# Patient Record
(demographics unavailable — no encounter records)

---

## 2024-10-15 NOTE — COUNSELING
[Fall prevention counseling provided] : Fall prevention counseling provided [Adequate lighting] : Adequate lighting [No throw rugs] : No throw rugs [Use proper foot wear] : Use proper foot wear [Behavioral health counseling provided] : Behavioral health counseling provided [Sleep ___ hours/day] : Sleep [unfilled] hours/day [Engage in a relaxing activity] : Engage in a relaxing activity [Yes] : Risk of tobacco use and health benefits of smoking cessation discussed: Yes [Cessation strategies including cessation program discussed] : Cessation strategies including cessation program discussed [AUDIT-C Screening administered and reviewed] : AUDIT-C Screening administered and reviewed [Hazards of at-risk alcohol use discussed] : Hazards of at-risk alcohol use discussed [Potential consequences of obesity discussed] : Potential consequences of obesity discussed [Benefits of weight loss discussed] : Benefits of weight loss discussed [Encouraged to increase physical activity] : Encouraged to increase physical activity [Weigh Self Weekly] : weigh self weekly [None] : None [Good understanding] : Patient has a good understanding of lifestyle changes and steps needed to achieve self management goal

## 2024-10-17 NOTE — ASSESSMENT
[FreeTextEntry1] : Overweight/Fatigue - labs for fatigue and dysmetabolism  Low Vit. D - Vit D Level  Immunity Status Testing - Titer Panel. HLD - Lipid Panel  OB/GYN - last seen 16 years ago. Referred today.  Pilonidal Cyst - Managed by Colon/Rectal (Dr. Rodriguez)  Overweight - pending labs will consider Ozempic at next appt.   F/U in 1-2 weeks to review results.

## 2024-10-17 NOTE — HEALTH RISK ASSESSMENT
[Good] : ~his/her~  mood as  good [1 or 2 (0 pts)] : 1 or 2 (0 points) [No falls in past year] : Patient reported no falls in the past year [0] : 2) Feeling down, depressed, or hopeless: Not at all (0) [PHQ-2 Negative - No further assessment needed] : PHQ-2 Negative - No further assessment needed [NO] : No [Patient declined Low Dose CT Scan] : Patient declined Low Dose CT Scan [No Retinopathy] : No retinopathy [No] : No [Never (0 pts)] : Never (0 points) [Current] : Current [0-4] : 0-4 [Patient declined mammogram] : Patient declined mammogram [Patient declined bone density test] : Patient declined bone density test [Patient declined colonoscopy] : Patient declined colonoscopy [HIV test declined] : HIV test declined [Hepatitis C test declined] : Hepatitis C test declined [None] : None [With Family] : lives with family [# of Members in Household ___] :  household currently consist of [unfilled] member(s) [Employed] : employed [College] : College [Single] : single [# Of Children ___] : has [unfilled] children [Sexually Active] : sexually active [Feels Safe at Home] : Feels safe at home [Fully functional (bathing, dressing, toileting, transferring, walking, feeding)] : Fully functional (bathing, dressing, toileting, transferring, walking, feeding) [Fully functional (using the telephone, shopping, preparing meals, housekeeping, doing laundry, using] : Fully functional and needs no help or supervision to perform IADLs (using the telephone, shopping, preparing meals, housekeeping, doing laundry, using transportation, managing medications and managing finances) [Reports normal functional visual acuity (ie: able to read med bottle)] : Reports normal functional visual acuity [Smoke Detector] : smoke detector [Carbon Monoxide Detector] : carbon monoxide detector [Safety elements used in home] : safety elements used in home [Seat Belt] :  uses seat belt [Sunscreen] : uses sunscreen [Patient/Caregiver not ready to engage] : , patient/caregiver not ready to engage [I will adhere to the patient's wishes.] : I will adhere to the patient's wishes. [Time Spent: ___ minutes] : Time Spent: [unfilled] minutes [Audit-CScore] : 0 [de-identified] : Average [de-identified] : Average [[facility]] : 6 [[id number]] : 0 [LowDoseCTScan] : 10/24 [EyeExamDate] : 10/24 [Change in mental status noted] : No change in mental status noted [Language] : denies difficulty with language [Behavior] : denies difficulty with behavior [Learning/Retaining New Information] : denies difficulty learning/retaining new information [Handling Complex Tasks] : denies difficulty handling complex tasks [Reasoning] : denies difficulty with reasoning [Spatial Ability and Orientation] : denies difficulty with spatial ability and orientation [High Risk Behavior] : no high risk behavior [Reports changes in hearing] : Reports no changes in hearing [Reports changes in vision] : Reports no changes in vision [Reports changes in dental health] : Reports no changes in dental health [Travel to Developing Areas] : does not  travel to developing areas [TB Exposure] : is not being exposed to tuberculosis [Caregiver Concerns] : does not have caregiver concerns [MammogramDate] : 10/24 [PapSmearDate] : 10/24 [PapSmearComments] : referred today  [BoneDensityDate] : 10/24 [ColonoscopyDate] : 10/24 [HIVDate] : 10/24 [HepatitisCDate] : 10/24 [FreeTextEntry2] : No-Fault   [AdvancecareDate] : 10/24

## 2024-10-17 NOTE — REVIEW OF SYSTEMS
[Fatigue] : fatigue [Hair Changes] : hair changes [Negative] : Genitourinary [Fever] : no fever [Chills] : no chills [Hot Flashes] : no hot flashes [Night Sweats] : no night sweats [Recent Change In Weight] : ~T no recent weight change [Itching] : no itching [Mole Changes] : no mole changes [Nail Changes] : no nail changes [Skin Rash] : no skin rash [FreeTextEntry2] : overweight [FreeTextEntry7] :   Pilonidal Cyst, Cholecystectomy [de-identified] : Acne Vulgaris, hirsutism  [FreeTextEntry1] : Vit D Deficient

## 2024-10-17 NOTE — HEALTH RISK ASSESSMENT
[Good] : ~his/her~  mood as  good [1 or 2 (0 pts)] : 1 or 2 (0 points) [No falls in past year] : Patient reported no falls in the past year [0] : 2) Feeling down, depressed, or hopeless: Not at all (0) [PHQ-2 Negative - No further assessment needed] : PHQ-2 Negative - No further assessment needed [NO] : No [Patient declined Low Dose CT Scan] : Patient declined Low Dose CT Scan [No Retinopathy] : No retinopathy [No] : No [Never (0 pts)] : Never (0 points) [Current] : Current [0-4] : 0-4 [Patient declined mammogram] : Patient declined mammogram [Patient declined bone density test] : Patient declined bone density test [Patient declined colonoscopy] : Patient declined colonoscopy [HIV test declined] : HIV test declined [Hepatitis C test declined] : Hepatitis C test declined [None] : None [With Family] : lives with family [# of Members in Household ___] :  household currently consist of [unfilled] member(s) [Employed] : employed [College] : College [Single] : single [# Of Children ___] : has [unfilled] children [Sexually Active] : sexually active [Feels Safe at Home] : Feels safe at home [Fully functional (bathing, dressing, toileting, transferring, walking, feeding)] : Fully functional (bathing, dressing, toileting, transferring, walking, feeding) [Fully functional (using the telephone, shopping, preparing meals, housekeeping, doing laundry, using] : Fully functional and needs no help or supervision to perform IADLs (using the telephone, shopping, preparing meals, housekeeping, doing laundry, using transportation, managing medications and managing finances) [Reports normal functional visual acuity (ie: able to read med bottle)] : Reports normal functional visual acuity [Smoke Detector] : smoke detector [Carbon Monoxide Detector] : carbon monoxide detector [Safety elements used in home] : safety elements used in home [Seat Belt] :  uses seat belt [Sunscreen] : uses sunscreen [Patient/Caregiver not ready to engage] : , patient/caregiver not ready to engage [I will adhere to the patient's wishes.] : I will adhere to the patient's wishes. [Time Spent: ___ minutes] : Time Spent: [unfilled] minutes [Audit-CScore] : 0 [de-identified] : Average [de-identified] : Average [facility]] : [facility] [[id number]] : 0 [LowDoseCTScan] : 10/24 [EyeExamDate] : 10/24 [Change in mental status noted] : No change in mental status noted [Language] : denies difficulty with language [Behavior] : denies difficulty with behavior [Learning/Retaining New Information] : denies difficulty learning/retaining new information [Handling Complex Tasks] : denies difficulty handling complex tasks [Reasoning] : denies difficulty with reasoning [Spatial Ability and Orientation] : denies difficulty with spatial ability and orientation [High Risk Behavior] : no high risk behavior [Reports changes in hearing] : Reports no changes in hearing [Reports changes in vision] : Reports no changes in vision [Reports changes in dental health] : Reports no changes in dental health [Travel to Developing Areas] : does not  travel to developing areas [TB Exposure] : is not being exposed to tuberculosis [Caregiver Concerns] : does not have caregiver concerns [MammogramDate] : 10/24 [PapSmearDate] : 10/24 [PapSmearComments] : referred today  [BoneDensityDate] : 10/24 [ColonoscopyDate] : 10/24 [HIVDate] : 10/24 [HepatitisCDate] : 10/24 [FreeTextEntry2] : No-Fault   [AdvancecareDate] : 10/24

## 2024-10-17 NOTE — HEALTH RISK ASSESSMENT
[Good] : ~his/her~  mood as  good [1 or 2 (0 pts)] : 1 or 2 (0 points) [No falls in past year] : Patient reported no falls in the past year [0] : 2) Feeling down, depressed, or hopeless: Not at all (0) [PHQ-2 Negative - No further assessment needed] : PHQ-2 Negative - No further assessment needed [NO] : No [Patient declined Low Dose CT Scan] : Patient declined Low Dose CT Scan [No Retinopathy] : No retinopathy [No] : No [Never (0 pts)] : Never (0 points) [Current] : Current [0-4] : 0-4 [Patient declined mammogram] : Patient declined mammogram [Patient declined bone density test] : Patient declined bone density test [Patient declined colonoscopy] : Patient declined colonoscopy [HIV test declined] : HIV test declined [Hepatitis C test declined] : Hepatitis C test declined [None] : None [With Family] : lives with family [# of Members in Household ___] :  household currently consist of [unfilled] member(s) [Employed] : employed [College] : College [Single] : single [# Of Children ___] : has [unfilled] children [Sexually Active] : sexually active [Feels Safe at Home] : Feels safe at home [Fully functional (bathing, dressing, toileting, transferring, walking, feeding)] : Fully functional (bathing, dressing, toileting, transferring, walking, feeding) [Fully functional (using the telephone, shopping, preparing meals, housekeeping, doing laundry, using] : Fully functional and needs no help or supervision to perform IADLs (using the telephone, shopping, preparing meals, housekeeping, doing laundry, using transportation, managing medications and managing finances) [Reports normal functional visual acuity (ie: able to read med bottle)] : Reports normal functional visual acuity [Smoke Detector] : smoke detector [Carbon Monoxide Detector] : carbon monoxide detector [Safety elements used in home] : safety elements used in home [Seat Belt] :  uses seat belt [Sunscreen] : uses sunscreen [Patient/Caregiver not ready to engage] : , patient/caregiver not ready to engage [I will adhere to the patient's wishes.] : I will adhere to the patient's wishes. [Time Spent: ___ minutes] : Time Spent: [unfilled] minutes [Audit-CScore] : 0 [de-identified] : Average [de-identified] : Average [facility]] : 6 [[id number]] : 0 [LowDoseCTScan] : 10/24 [EyeExamDate] : 10/24 [Change in mental status noted] : No change in mental status noted [Language] : denies difficulty with language [Behavior] : denies difficulty with behavior [Learning/Retaining New Information] : denies difficulty learning/retaining new information [Handling Complex Tasks] : denies difficulty handling complex tasks [Reasoning] : denies difficulty with reasoning [Spatial Ability and Orientation] : denies difficulty with spatial ability and orientation [High Risk Behavior] : no high risk behavior [Reports changes in hearing] : Reports no changes in hearing [Reports changes in vision] : Reports no changes in vision [Reports changes in dental health] : Reports no changes in dental health [Travel to Developing Areas] : does not  travel to developing areas [TB Exposure] : is not being exposed to tuberculosis [Caregiver Concerns] : does not have caregiver concerns [MammogramDate] : 10/24 [PapSmearDate] : 10/24 [PapSmearComments] : referred today  [BoneDensityDate] : 10/24 [ColonoscopyDate] : 10/24 [HIVDate] : 10/24 [HepatitisCDate] : 10/24 [FreeTextEntry2] : No-Fault   [AdvancecareDate] : 10/24

## 2024-10-17 NOTE — REVIEW OF SYSTEMS
[Fatigue] : fatigue [Hair Changes] : hair changes [Negative] : Genitourinary [Fever] : no fever [Chills] : no chills [Hot Flashes] : no hot flashes [Night Sweats] : no night sweats [Recent Change In Weight] : ~T no recent weight change [Itching] : no itching [Mole Changes] : no mole changes [Nail Changes] : no nail changes [Skin Rash] : no skin rash [FreeTextEntry2] : overweight [FreeTextEntry7] :   Pilonidal Cyst, Cholecystectomy [de-identified] : Acne Vulgaris, hirsutism  [FreeTextEntry1] : Vit D Deficient

## 2024-10-17 NOTE — REVIEW OF SYSTEMS
[Fatigue] : fatigue [Hair Changes] : hair changes [Negative] : Genitourinary [Fever] : no fever [Chills] : no chills [Hot Flashes] : no hot flashes [Night Sweats] : no night sweats [Recent Change In Weight] : ~T no recent weight change [Itching] : no itching [Mole Changes] : no mole changes [Nail Changes] : no nail changes [Skin Rash] : no skin rash [FreeTextEntry2] : overweight [FreeTextEntry7] :   Pilonidal Cyst, Cholecystectomy [de-identified] : Acne Vulgaris, hirsutism  [FreeTextEntry1] : Vit D Deficient

## 2024-10-28 NOTE — HEALTH RISK ASSESSMENT
[No] : No [1 or 2 (0 pts)] : 1 or 2 (0 points) [Never (0 pts)] : Never (0 points) [No falls in past year] : Patient reported no falls in the past year [0] : 2) Feeling down, depressed, or hopeless: Not at all (0) [PHQ-2 Negative - No further assessment needed] : PHQ-2 Negative - No further assessment needed [Patient/Caregiver not ready to engage] : , patient/caregiver not ready to engage [I will adhere to the patient's wishes.] : I will adhere to the patient's wishes. [Time Spent: ___ minutes] : Time Spent: [unfilled] minutes [Current] : Current [0-4] : 0-4

## 2025-02-24 NOTE — REVIEW OF SYSTEMS
[Fatigue] : fatigue [Hair Changes] : hair changes [Negative] : Heme/Lymph [Fever] : no fever [Chills] : no chills [Hot Flashes] : no hot flashes [Night Sweats] : no night sweats [Recent Change In Weight] : ~T no recent weight change [Itching] : no itching [Mole Changes] : no mole changes [Nail Changes] : no nail changes [Skin Rash] : no skin rash [FreeTextEntry2] : overweight [FreeTextEntry7] :   Pilonidal Cyst, Cholecystectomy [de-identified] : Acne Vulgaris, hirsutism  [FreeTextEntry1] : Vit D Deficient

## 2025-02-24 NOTE — ASSESSMENT
[FreeTextEntry1] : Overweight/Fatigue - Labs for Fatigue and Dysmetabolism. Low Vit. D - Vit D Level. Immunity Status Testing - Pt. Requires 2 MMr and 2 Varicella Vaccines. HLD - Lipid Panel  OB/GYN - last seen 16 years ago. Referred pt making appt next week.  Pilonidal Cyst Flare - Bactrim + Duricef BID  Surgical Referral. Overweight - pending labs will consider Ozempic at next appt.   F/U in 3 wks to Review Test Results

## 2025-02-24 NOTE — HEALTH RISK ASSESSMENT
[No] : No [1 or 2 (0 pts)] : 1 or 2 (0 points) [Never (0 pts)] : Never (0 points) [No falls in past year] : Patient reported no falls in the past year [0] : 2) Feeling down, depressed, or hopeless: Not at all (0) [PHQ-2 Negative - No further assessment needed] : PHQ-2 Negative - No further assessment needed [Patient/Caregiver not ready to engage] : , patient/caregiver not ready to engage [I will adhere to the patient's wishes.] : I will adhere to the patient's wishes. [Time Spent: ___ minutes] : Time Spent: [unfilled] minutes [Current] : Current [0-4] : 0-4 [Audit-CScore] : 0 [de-identified] : Average [de-identified] : Average [[id number]] : 0 [[facility] : [facility] [AdvancecareDate] : 10/24

## 2025-02-24 NOTE — HISTORY OF PRESENT ILLNESS
[FreeTextEntry1] : F/U appt for lab testing review.  [de-identified] : F/U appt for lab testing review.

## 2025-02-24 NOTE — HEALTH RISK ASSESSMENT
[No] : No [1 or 2 (0 pts)] : 1 or 2 (0 points) [Never (0 pts)] : Never (0 points) [No falls in past year] : Patient reported no falls in the past year [0] : 2) Feeling down, depressed, or hopeless: Not at all (0) [PHQ-2 Negative - No further assessment needed] : PHQ-2 Negative - No further assessment needed [Patient/Caregiver not ready to engage] : , patient/caregiver not ready to engage [I will adhere to the patient's wishes.] : I will adhere to the patient's wishes. [Time Spent: ___ minutes] : Time Spent: [unfilled] minutes [Current] : Current [0-4] : 0-4 [Audit-CScore] : 0 [de-identified] : Average [de-identified] : Average [[id number]] : 0 [facility] : [facility] [AdvancecareDate] : 10/24

## 2025-02-24 NOTE — REVIEW OF SYSTEMS
[Fatigue] : fatigue [Hair Changes] : hair changes [Negative] : Heme/Lymph [Fever] : no fever [Chills] : no chills [Hot Flashes] : no hot flashes [Night Sweats] : no night sweats [Recent Change In Weight] : ~T no recent weight change [Itching] : no itching [Mole Changes] : no mole changes [Nail Changes] : no nail changes [Skin Rash] : no skin rash [FreeTextEntry2] : overweight [FreeTextEntry7] :   Pilonidal Cyst, Cholecystectomy [de-identified] : Acne Vulgaris, hirsutism  [FreeTextEntry1] : Vit D Deficient

## 2025-02-24 NOTE — HISTORY OF PRESENT ILLNESS
[FreeTextEntry1] : F/U appt for lab testing review.  [de-identified] : F/U appt for lab testing review.

## 2025-03-10 NOTE — HISTORY OF PRESENT ILLNESS
[FreeTextEntry1] : 3/10/2025: Patient presents with complaints of persistent pilonidal disease.  Not currently flaring but the area has drained frequently.  She was planned to have surgery for this last year but had issues with the flu and bronchitis that forced her to cancel.  She cannot recall the last time she was able to clip the hair to the area.  2/12/2024: Presents today with recurrence of pilonidal abscess over the last several days.  No fevers, no drainage.  Symptoms are similar to before. She would like to proceed with eventual surgery.  1/1/28/2023: Presents for follow-up of pilonidal cyst.  Her symptoms are very manageable.  Two weeks after her last visit the area flared up again but since then it has been quiescent.  She has kept up with hair removal by shaving the area regularly.  In the interim she has undergone cholecystectomy and is recovering well from that.  9/11/2023: 34yoF with no significant PMH who presents with pilonidal cyst.  She has undergone incision and drainage x2 for pilonidal abscess, first in 12/2022 and most recently at the end of August.  Prior to this she had never had any issues.  No fever currently but when she had infection she did have fevers.  Area is still draining slightly but no overt pain.  Her brother has a pilonidal cyst but this has never required treatment.  Reports family history of colon cancer in her paternal grandfather; parents are up to date on colonoscopy and no issues as far as she knows.

## 2025-03-10 NOTE — PHYSICAL EXAM
[JVD] : no jugular venous distention  [Respiratory Effort] : normal respiratory effort [Normal Rate and Rhythm] : normal rate and rhythm [No Edema] : No edema [Alert] : alert [Oriented to Person] : oriented to person [Oriented to Place] : oriented to place [Oriented to Time] : oriented to time [Calm] : calm [de-identified] : Soft, nondistended [de-identified] : No distress [de-identified] : Normocephalic [de-identified] : Moves all extremities [de-identified] : At the top of the gluteal cleft to the right there is a pilonidal cyst without erythema or fluctuance.

## 2025-03-10 NOTE — PLAN
[TextEntry] : -- Patient has persistent pilonidal disease and is eager to move forward with scheduling excision -- Plan for OR next week.  Reviewed recent labs and note from her PCP.  CBC with minimal elevation to WBC; A1C normal, CMP normal.  Cleared for procedure from my standpoint.  Would defer decision of IV sedation vs general endotracheal anesthesia to Anesthesia team pending evaluation in preop area. -- Discussed with patient risks of bleeding, infection, recurrence, prolonged wound healing, risks of anesthesia.  She understands. -- I will next see patient for her scheduled surgery.

## 2025-03-18 NOTE — HEALTH RISK ASSESSMENT
[No] : No [1 or 2 (0 pts)] : 1 or 2 (0 points) [Never (0 pts)] : Never (0 points) [No falls in past year] : Patient reported no falls in the past year [0] : 2) Feeling down, depressed, or hopeless: Not at all (0) [PHQ-2 Negative - No further assessment needed] : PHQ-2 Negative - No further assessment needed [Patient/Caregiver not ready to engage] : , patient/caregiver not ready to engage [I will adhere to the patient's wishes.] : I will adhere to the patient's wishes. [Time Spent: ___ minutes] : Time Spent: [unfilled] minutes [Current] : Current [0-4] : 0-4 [Audit-CScore] : 0 [de-identified] : Average [de-identified] : Average [facility] : [facility] [[id number]] : 0 [AdvancecareDate] : 10/24 negative details…

## 2025-03-18 NOTE — HISTORY OF PRESENT ILLNESS
[FreeTextEntry1] : F/U appt for lab testing review.  [de-identified] : F/U appt for lab testing review.

## 2025-03-18 NOTE — HISTORY OF PRESENT ILLNESS
[FreeTextEntry1] : F/U appt for lab testing review.  [de-identified] : F/U appt for lab testing review.

## 2025-03-18 NOTE — REVIEW OF SYSTEMS
[Fatigue] : fatigue [Hair Changes] : hair changes [Negative] : Heme/Lymph [Fever] : no fever [Chills] : no chills [Hot Flashes] : no hot flashes [Night Sweats] : no night sweats [Recent Change In Weight] : ~T no recent weight change [Itching] : no itching [Mole Changes] : no mole changes [Nail Changes] : no nail changes [Skin Rash] : no skin rash [FreeTextEntry2] : overweight [FreeTextEntry7] :   Pilonidal Cyst, Cholecystectomy [de-identified] : Acne Vulgaris, hirsutism  [FreeTextEntry1] : Vit D Deficient

## 2025-03-18 NOTE — HEALTH RISK ASSESSMENT
[No] : No [1 or 2 (0 pts)] : 1 or 2 (0 points) [Never (0 pts)] : Never (0 points) [No falls in past year] : Patient reported no falls in the past year [0] : 2) Feeling down, depressed, or hopeless: Not at all (0) [PHQ-2 Negative - No further assessment needed] : PHQ-2 Negative - No further assessment needed [Patient/Caregiver not ready to engage] : , patient/caregiver not ready to engage [I will adhere to the patient's wishes.] : I will adhere to the patient's wishes. [Time Spent: ___ minutes] : Time Spent: [unfilled] minutes [Current] : Current [0-4] : 0-4 [Audit-CScore] : 0 [de-identified] : Average [de-identified] : Average [[facility]] : 6 [[id number]] : 0 [AdvancecareDate] : 10/24

## 2025-03-18 NOTE — ASSESSMENT
[FreeTextEntry1] : Overweight/Fatigue/PVD/ - Diet and Exercise Consider Wegovy. Low Vit. D - Vit D Abnormal CBC/Low Iron - Hematology Referral. Immunity Status Testing - Pt. Requires 2 MMr and 2 Varicella Vaccines. HLD - Lipid Panel  OB/GYN - last seen 16 years ago. Referred pt making appt next week.  Pilonidal Cyst Flare - Bactrim + Duricef BID  Surgical Referral. Overweight - pending labs will consider Ozempic at next appt.   F/U in 3 wks to Review Test Results

## 2025-03-18 NOTE — REVIEW OF SYSTEMS
[Fatigue] : fatigue [Hair Changes] : hair changes [Negative] : Heme/Lymph [Fever] : no fever [Chills] : no chills [Hot Flashes] : no hot flashes [Night Sweats] : no night sweats [Recent Change In Weight] : ~T no recent weight change [Itching] : no itching [Mole Changes] : no mole changes [Nail Changes] : no nail changes [Skin Rash] : no skin rash [FreeTextEntry2] : overweight [FreeTextEntry7] :   Pilonidal Cyst, Cholecystectomy [de-identified] : Acne Vulgaris, hirsutism  [FreeTextEntry1] : Vit D Deficient

## 2025-04-09 NOTE — HISTORY OF PRESENT ILLNESS
[FreeTextEntry1] : 36 year old female with hx of pilonidal disease with recurrent abscess/cyst s/p excision of pilonidal cyst with Dr. Rodriugez on 3/20/25. Pt here for post-op wound check. Pt doing well. Has no complaints. States drainage stopped 1-2 days ago. Denied any pain.

## 2025-04-09 NOTE — ASSESSMENT
[FreeTextEntry1] : 36 year old female with hx of pilonidal disease with recurrent abscess/cyst s/p excision of pilonidal cyst with Dr. Rodriguez on 3/20/25. Patient doing well post-op. Advised pt to keep wound clean and dry. Follow-up with Dr. Rodriguez in 3-4 weeks. Advised pt to call us for any concerns prior to appointment.

## 2025-04-09 NOTE — PHYSICAL EXAM
[Normal Rate and Rhythm] : normal rate and rhythm [Calm] : calm [de-identified] : Incision healing with Vicryl sutures intact. Inferior portion with good granulation tissue. Non-erythematous, no induration, no drainage noted. [de-identified] : well appearing in NAD [de-identified] : normocephalic atraumatic [de-identified] : no JVD [de-identified] : normal respiratory effort [de-identified] : moving all extremities [de-identified] : warm moist [de-identified] : A&O x 3

## 2025-04-09 NOTE — DATA REVIEWED
[FreeTextEntry1] : [facility] Accession Number : [id number] Patient:     NIXON HOOPER   Accession:                             [id number]  Collected Date/Time:                   3/20/2025 13:00 EDT Received Date/Time:                    3/20/2025 14:01 EDT  Surgical Pathology Report - Auth (Verified)  Specimen(s) Submitted 1  Pilonidal cyst short stitch superior, long stitch patient right  Final Diagnosis  Skin and soft tissue , pilonidal cyst , excision - Hyperplastic skin with dermal scar and chronic inflammation - Squamous sinus tract with pseudocyst and abscess  - Negative for malignancy Verified by: Rhett Guardado M.D. (Electronic Signature) Reported on: 03/26/25 13:52 EDT, [facility], [address] [place] [address] _________________________________________________________________

## 2025-04-21 NOTE — HEALTH RISK ASSESSMENT
[No] : No [1 or 2 (0 pts)] : 1 or 2 (0 points) [Never (0 pts)] : Never (0 points) [No falls in past year] : Patient reported no falls in the past year [0] : 2) Feeling down, depressed, or hopeless: Not at all (0) [PHQ-2 Negative - No further assessment needed] : PHQ-2 Negative - No further assessment needed [Patient/Caregiver not ready to engage] : , patient/caregiver not ready to engage [I will adhere to the patient's wishes.] : I will adhere to the patient's wishes. [Time Spent: ___ minutes] : Time Spent: [unfilled] minutes [Current] : Current [0-4] : 0-4 [Audit-CScore] : 0 [de-identified] : Average [de-identified] : Average [[facility]] : 6 [[id number]] : 0 [AdvancecareDate] : 10/24

## 2025-04-21 NOTE — ASSESSMENT
[FreeTextEntry1] : Overweight/Fatigue/PVD/ - Diet and Exercise Consider Wegovy. Low Vit. D - Vit D Abnormal CBC/Low Iron - Hematology Referral. Immunity Status Testing - Pt. Requires 2 MMr and 2 Varicella Vaccines. HLD - Lipid Panel  OB/GYN - last seen 16 years ago. Referred pt making appt next week.  Pilonidal Cyst Flare - Bactrim + Duricef BID  Surgical Referral. Overweight - pending labs will consider Ozempic at next appt.  MMR Vaccine administered left arm NDC: [id number] [id number]  F/U in 4 weeks for varicella #1

## 2025-04-21 NOTE — REVIEW OF SYSTEMS
[Fatigue] : fatigue [Hair Changes] : hair changes [Negative] : Heme/Lymph [Fever] : no fever [Chills] : no chills [Hot Flashes] : no hot flashes [Night Sweats] : no night sweats [Recent Change In Weight] : ~T no recent weight change [Itching] : no itching [Mole Changes] : no mole changes [Nail Changes] : no nail changes [Skin Rash] : no skin rash [FreeTextEntry2] : overweight [FreeTextEntry7] :   Pilonidal Cyst, Cholecystectomy [de-identified] : Acne Vulgaris, hirsutism  [FreeTextEntry1] : Vit D Deficient

## 2025-04-21 NOTE — HEALTH RISK ASSESSMENT
[No] : No [1 or 2 (0 pts)] : 1 or 2 (0 points) [Never (0 pts)] : Never (0 points) [No falls in past year] : Patient reported no falls in the past year [0] : 2) Feeling down, depressed, or hopeless: Not at all (0) [PHQ-2 Negative - No further assessment needed] : PHQ-2 Negative - No further assessment needed [Patient/Caregiver not ready to engage] : , patient/caregiver not ready to engage [I will adhere to the patient's wishes.] : I will adhere to the patient's wishes. [Time Spent: ___ minutes] : Time Spent: [unfilled] minutes [Current] : Current [0-4] : 0-4 [Audit-CScore] : 0 [de-identified] : Average [de-identified] : Average [[facility]] : 6 [[id number]] : 0 [AdvancecareDate] : 10/24

## 2025-04-21 NOTE — REVIEW OF SYSTEMS
[Fatigue] : fatigue [Hair Changes] : hair changes [Negative] : Heme/Lymph [Fever] : no fever [Chills] : no chills [Hot Flashes] : no hot flashes [Night Sweats] : no night sweats [Recent Change In Weight] : ~T no recent weight change [Itching] : no itching [Mole Changes] : no mole changes [Nail Changes] : no nail changes [Skin Rash] : no skin rash [FreeTextEntry2] : overweight [FreeTextEntry7] :   Pilonidal Cyst, Cholecystectomy [de-identified] : Acne Vulgaris, hirsutism  [FreeTextEntry1] : Vit D Deficient

## 2025-04-21 NOTE — ASSESSMENT
[FreeTextEntry1] : Overweight/Fatigue/PVD/ - Diet and Exercise Consider Wegovy. Low Vit. D - Vit D Abnormal CBC/Low Iron - Hematology Referral. Immunity Status Testing - Pt. Requires 2 MMr and 2 Varicella Vaccines. HLD - Lipid Panel  OB/GYN - last seen 16 years ago. Referred pt making appt next week.  Pilonidal Cyst Flare - Bactrim + Duricef BID  Surgical Referral. Overweight - pending labs will consider Ozempic at next appt.  MMR Vaccine administered left arm NDC: 7101-4628-27 [id number]  F/U in 4 weeks for varicella #1

## 2025-04-30 NOTE — PHYSICAL EXAM
[JVD] : no jugular venous distention  [Respiratory Effort] : normal respiratory effort [Normal Rate and Rhythm] : normal rate and rhythm [No Edema] : No edema [Alert] : alert [Oriented to Person] : oriented to person [Oriented to Place] : oriented to place [Oriented to Time] : oriented to time [Calm] : calm [de-identified] : Soft, nondistended [de-identified] : No distress [de-identified] : Normocephalic [de-identified] : Moves all extremities [de-identified] : At the top of the gluteal cleft to the right there is a scar consistent with site of pilonidal cyst excision.  Few punctate open areas at inferior aspect where it appears some of the Vicryl suture has recently dissolved.  No erythema, tenderness, fluctuance to area.

## 2025-04-30 NOTE — PLAN
[TextEntry] : -- Patient recovering well -- Continue to clear hair from the area - clipped in clinic today, can use depilatory cream once skin has fully healed -- Follow up in [facility] clinic as needed for recurrence of symptoms

## 2025-04-30 NOTE — HISTORY OF PRESENT ILLNESS
[FreeTextEntry1] : 4/30/2025: She is s/p excision of pilonidal cyst 3/20/2025.  Seen for postop check by GABRIELLE Farrar earlier his month; feels well, no significant complaints.  Notes that (dissolvable) sutures have started falling off over the last few days, some mild drainage noted, no pain, fever/chills.  3/10/2025: Patient presents with complaints of persistent pilonidal disease.  Not currently flaring but the area has drained frequently.  She was planned to have surgery for this last year but had issues with the flu and bronchitis that forced her to cancel.  She cannot recall the last time she was able to clip the hair to the area.  2/12/2024: Presents today with recurrence of pilonidal abscess over the last several days.  No fevers, no drainage.  Symptoms are similar to before. She would like to proceed with eventual surgery.  1/1/28/2023: Presents for follow-up of pilonidal cyst.  Her symptoms are very manageable.  Two weeks after her last visit the area flared up again but since then it has been quiescent.  She has kept up with hair removal by shaving the area regularly.  In the interim she has undergone cholecystectomy and is recovering well from that.  9/11/2023: 34yoF with no significant PMH who presents with pilonidal cyst.  She has undergone incision and drainage x2 for pilonidal abscess, first in 12/2022 and most recently at the end of August.  Prior to this she had never had any issues.  No fever currently but when she had infection she did have fevers.  Area is still draining slightly but no overt pain.  Her brother has a pilonidal cyst but this has never required treatment.  Reports family history of colon cancer in her paternal grandfather; parents are up to date on colonoscopy and no issues as far as she knows.

## 2025-05-22 NOTE — HISTORY OF PRESENT ILLNESS
[FreeTextEntry1] : F/U appt for Varicella Vaccine #1 [de-identified] : F/U appt for Varicella Vaccine #1

## 2025-05-22 NOTE — REVIEW OF SYSTEMS
[Fatigue] : fatigue [Hair Changes] : hair changes [Negative] : Heme/Lymph [Fever] : no fever [Chills] : no chills [Hot Flashes] : no hot flashes [Night Sweats] : no night sweats [Recent Change In Weight] : ~T no recent weight change [Itching] : no itching [Mole Changes] : no mole changes [Nail Changes] : no nail changes [Skin Rash] : no skin rash [FreeTextEntry2] : overweight [FreeTextEntry7] :   Pilonidal Cyst, Cholecystectomy [de-identified] : Acne Vulgaris, hirsutism  [FreeTextEntry1] : Vit D Deficient

## 2025-05-22 NOTE — ASSESSMENT
[FreeTextEntry1] : Varicella vax given in left arm. Pt tolerated well.  Lot#[id number] NDC#006-4827-00 F/U in 4 weeks for varicella #1

## 2025-05-22 NOTE — ASSESSMENT
[FreeTextEntry1] : Varicella vax given in left arm. Pt tolerated well.  Lot#[id number] NDC#006-4827-00 F/U in 4 weeks for varicella #1

## 2025-05-22 NOTE — HEALTH RISK ASSESSMENT
[No] : No [1 or 2 (0 pts)] : 1 or 2 (0 points) [Never (0 pts)] : Never (0 points) [No falls in past year] : Patient reported no falls in the past year [0] : 2) Feeling down, depressed, or hopeless: Not at all (0) [PHQ-2 Negative - No further assessment needed] : PHQ-2 Negative - No further assessment needed [Patient/Caregiver not ready to engage] : , patient/caregiver not ready to engage [I will adhere to the patient's wishes.] : I will adhere to the patient's wishes. [Time Spent: ___ minutes] : Time Spent: [unfilled] minutes [Current] : Current [0-4] : 0-4 [Audit-CScore] : 0 [de-identified] : Average [de-identified] : Average [facility] : 6 [[id number]] : 0 [AdvancecareDate] : 10/24

## 2025-05-22 NOTE — REVIEW OF SYSTEMS
[Fatigue] : fatigue [Hair Changes] : hair changes [Negative] : Heme/Lymph [Fever] : no fever [Chills] : no chills [Hot Flashes] : no hot flashes [Night Sweats] : no night sweats [Recent Change In Weight] : ~T no recent weight change [Itching] : no itching [Mole Changes] : no mole changes [Nail Changes] : no nail changes [Skin Rash] : no skin rash [FreeTextEntry2] : overweight [FreeTextEntry7] :   Pilonidal Cyst, Cholecystectomy [de-identified] : Acne Vulgaris, hirsutism  [FreeTextEntry1] : Vit D Deficient

## 2025-05-22 NOTE — HISTORY OF PRESENT ILLNESS
[FreeTextEntry1] : F/U appt for Varicella Vaccine #1 [de-identified] : F/U appt for Varicella Vaccine #1

## 2025-05-22 NOTE — HEALTH RISK ASSESSMENT
[No] : No [1 or 2 (0 pts)] : 1 or 2 (0 points) [Never (0 pts)] : Never (0 points) [No falls in past year] : Patient reported no falls in the past year [0] : 2) Feeling down, depressed, or hopeless: Not at all (0) [PHQ-2 Negative - No further assessment needed] : PHQ-2 Negative - No further assessment needed [Patient/Caregiver not ready to engage] : , patient/caregiver not ready to engage [I will adhere to the patient's wishes.] : I will adhere to the patient's wishes. [Time Spent: ___ minutes] : Time Spent: [unfilled] minutes [Current] : Current [0-4] : 0-4 [Audit-CScore] : 0 [de-identified] : Average [de-identified] : Average [facility] : 6 [[id number]] : 0 [AdvancecareDate] : 10/24

## 2025-05-22 NOTE — REVIEW OF SYSTEMS
[Fatigue] : fatigue [Hair Changes] : hair changes [Negative] : Heme/Lymph [Fever] : no fever [Chills] : no chills [Hot Flashes] : no hot flashes [Night Sweats] : no night sweats [Recent Change In Weight] : ~T no recent weight change [Itching] : no itching [Mole Changes] : no mole changes [Nail Changes] : no nail changes [Skin Rash] : no skin rash [FreeTextEntry2] : overweight [FreeTextEntry7] :   Pilonidal Cyst, Cholecystectomy [de-identified] : Acne Vulgaris, hirsutism  [FreeTextEntry1] : Vit D Deficient

## 2025-05-22 NOTE — HISTORY OF PRESENT ILLNESS
[FreeTextEntry1] : F/U appt for Varicella Vaccine #1 [de-identified] : F/U appt for Varicella Vaccine #1

## 2025-05-22 NOTE — ASSESSMENT
[FreeTextEntry1] : Varicella vax given in left arm. Pt tolerated well.  Lot#[id number] NDC#[id number] F/U in 4 weeks for varicella #1

## 2025-05-22 NOTE — HEALTH RISK ASSESSMENT
[No] : No [1 or 2 (0 pts)] : 1 or 2 (0 points) [Never (0 pts)] : Never (0 points) [No falls in past year] : Patient reported no falls in the past year [0] : 2) Feeling down, depressed, or hopeless: Not at all (0) [PHQ-2 Negative - No further assessment needed] : PHQ-2 Negative - No further assessment needed [Patient/Caregiver not ready to engage] : , patient/caregiver not ready to engage [I will adhere to the patient's wishes.] : I will adhere to the patient's wishes. [Time Spent: ___ minutes] : Time Spent: [unfilled] minutes [Current] : Current [0-4] : 0-4 [Audit-CScore] : 0 [de-identified] : Average [de-identified] : Average [facility] : [facility] [[id number]] : 0 [AdvancecareDate] : 10/24

## 2025-06-23 NOTE — PHYSICAL EXAM
Render Risk Assessment In Note?: yes Detail Level: Simple Additional Notes: -s/p efudex in 2020 [No Acute Distress] : no acute distress [Well Nourished] : well nourished [Well Developed] : well developed [Well-Appearing] : well-appearing [Normal Sclera/Conjunctiva] : normal sclera/conjunctiva [PERRL] : pupils equal round and reactive to light [EOMI] : extraocular movements intact [Normal Outer Ear/Nose] : the outer ears and nose were normal in appearance [Normal Oropharynx] : the oropharynx was normal [No JVD] : no jugular venous distention [No Lymphadenopathy] : no lymphadenopathy [Supple] : supple [Thyroid Normal, No Nodules] : the thyroid was normal and there were no nodules present [No Respiratory Distress] : no respiratory distress  [No Accessory Muscle Use] : no accessory muscle use [Clear to Auscultation] : lungs were clear to auscultation bilaterally [Normal Rate] : normal rate  [Regular Rhythm] : with a regular rhythm [Normal S1, S2] : normal S1 and S2 [No Murmur] : no murmur heard [No Carotid Bruits] : no carotid bruits [No Abdominal Bruit] : a ~M bruit was not heard ~T in the abdomen [No Varicosities] : no varicosities [Pedal Pulses Present] : the pedal pulses are present [No Edema] : there was no peripheral edema [No Palpable Aorta] : no palpable aorta [No Extremity Clubbing/Cyanosis] : no extremity clubbing/cyanosis [Soft] : abdomen soft [Non Tender] : non-tender [Non-distended] : non-distended [No Masses] : no abdominal mass palpated [No HSM] : no HSM [Normal Bowel Sounds] : normal bowel sounds [Normal Posterior Cervical Nodes] : no posterior cervical lymphadenopathy [Normal Anterior Cervical Nodes] : no anterior cervical lymphadenopathy [No CVA Tenderness] : no CVA  tenderness [No Spinal Tenderness] : no spinal tenderness [No Joint Swelling] : no joint swelling [Grossly Normal Strength/Tone] : grossly normal strength/tone [No Rash] : no rash [Coordination Grossly Intact] : coordination grossly intact [No Focal Deficits] : no focal deficits [Normal Gait] : normal gait [Deep Tendon Reflexes (DTR)] : deep tendon reflexes were 2+ and symmetric [Normal Affect] : the affect was normal [Normal Insight/Judgement] : insight and judgment were intact

## 2025-06-23 NOTE — HEALTH RISK ASSESSMENT
[No] : No [1 or 2 (0 pts)] : 1 or 2 (0 points) [Never (0 pts)] : Never (0 points) [No falls in past year] : Patient reported no falls in the past year [0] : 2) Feeling down, depressed, or hopeless: Not at all (0) [PHQ-2 Negative - No further assessment needed] : PHQ-2 Negative - No further assessment needed [Patient/Caregiver not ready to engage] : , patient/caregiver not ready to engage [I will adhere to the patient's wishes.] : I will adhere to the patient's wishes. [Time Spent: ___ minutes] : Time Spent: [unfilled] minutes [Current] : Current [0-4] : 0-4 [Audit-CScore] : 0 [de-identified] : Average [de-identified] : Average [facility] : [facility] [[id number]] : 0 [AdvancecareDate] : 10/24

## 2025-06-23 NOTE — REVIEW OF SYSTEMS
[Fatigue] : fatigue [Hair Changes] : hair changes [Negative] : Heme/Lymph [Fever] : no fever [Chills] : no chills [Hot Flashes] : no hot flashes [Night Sweats] : no night sweats [Recent Change In Weight] : ~T no recent weight change [Itching] : no itching [Mole Changes] : no mole changes [Nail Changes] : no nail changes [Skin Rash] : no skin rash [FreeTextEntry2] : overweight [FreeTextEntry7] :   Pilonidal Cyst, Cholecystectomy [de-identified] : Acne Vulgaris, hirsutism  [FreeTextEntry1] : Vit D Deficient

## 2025-06-23 NOTE — REVIEW OF SYSTEMS
[Fatigue] : fatigue [Hair Changes] : hair changes [Negative] : Heme/Lymph [Fever] : no fever [Chills] : no chills [Hot Flashes] : no hot flashes [Night Sweats] : no night sweats [Recent Change In Weight] : ~T no recent weight change [Itching] : no itching [Mole Changes] : no mole changes [Nail Changes] : no nail changes [Skin Rash] : no skin rash [FreeTextEntry2] : overweight [FreeTextEntry7] :   Pilonidal Cyst, Cholecystectomy [de-identified] : Acne Vulgaris, hirsutism  [FreeTextEntry1] : Vit D Deficient

## 2025-06-23 NOTE — HEALTH RISK ASSESSMENT
[No] : No [1 or 2 (0 pts)] : 1 or 2 (0 points) [Never (0 pts)] : Never (0 points) [No falls in past year] : Patient reported no falls in the past year [0] : 2) Feeling down, depressed, or hopeless: Not at all (0) [PHQ-2 Negative - No further assessment needed] : PHQ-2 Negative - No further assessment needed [Patient/Caregiver not ready to engage] : , patient/caregiver not ready to engage [I will adhere to the patient's wishes.] : I will adhere to the patient's wishes. [Time Spent: ___ minutes] : Time Spent: [unfilled] minutes [Current] : Current [0-4] : 0-4 [Audit-CScore] : 0 [de-identified] : Average [de-identified] : Average [facility] : [facility] [[id number]] : 0 [AdvancecareDate] : 10/24

## 2025-07-21 NOTE — HEALTH RISK ASSESSMENT
[Audit-CScore] : 0 [de-identified] : Average [de-identified] : Average [facility] : [facility] [[id number]] : 0 [AdvancecareDate] : 10/24 [No] : No [1 or 2 (0 pts)] : 1 or 2 (0 points) [Never (0 pts)] : Never (0 points) [No falls in past year] : Patient reported no falls in the past year [0] : 2) Feeling down, depressed, or hopeless: Not at all (0) [PHQ-2 Negative - No further assessment needed] : PHQ-2 Negative - No further assessment needed [Patient/Caregiver not ready to engage] : , patient/caregiver not ready to engage [I will adhere to the patient's wishes.] : I will adhere to the patient's wishes. [Time Spent: ___ minutes] : Time Spent: [unfilled] minutes [Current] : Current [0-4] : 0-4

## 2025-07-21 NOTE — REVIEW OF SYSTEMS
[Fever] : no fever [Chills] : no chills [Hot Flashes] : no hot flashes [Night Sweats] : no night sweats [Recent Change In Weight] : ~T no recent weight change [Itching] : no itching [Mole Changes] : no mole changes [Nail Changes] : no nail changes [Skin Rash] : no skin rash [FreeTextEntry2] : overweight [FreeTextEntry7] :   Pilonidal Cyst, Cholecystectomy [de-identified] : Acne Vulgaris, hirsutism  [FreeTextEntry1] : Vit D Deficient  [Fatigue] : fatigue [Hair Changes] : hair changes [Negative] : Heme/Lymph

## 2025-07-21 NOTE — REVIEW OF SYSTEMS
[Fever] : no fever [Chills] : no chills [Hot Flashes] : no hot flashes [Night Sweats] : no night sweats [Recent Change In Weight] : ~T no recent weight change [Itching] : no itching [Mole Changes] : no mole changes [Nail Changes] : no nail changes [Skin Rash] : no skin rash [FreeTextEntry2] : overweight [FreeTextEntry7] :   Pilonidal Cyst, Cholecystectomy [de-identified] : Acne Vulgaris, hirsutism  [FreeTextEntry1] : Vit D Deficient  [Fatigue] : fatigue [Hair Changes] : hair changes [Negative] : Heme/Lymph

## 2025-07-21 NOTE — HEALTH RISK ASSESSMENT
[Audit-CScore] : 0 [de-identified] : Average [de-identified] : Average [[facility] : [facility] [[id number]] : 0 [AdvancecareDate] : 10/24 [No] : No [1 or 2 (0 pts)] : 1 or 2 (0 points) [Never (0 pts)] : Never (0 points) [No falls in past year] : Patient reported no falls in the past year [0] : 2) Feeling down, depressed, or hopeless: Not at all (0) [PHQ-2 Negative - No further assessment needed] : PHQ-2 Negative - No further assessment needed [Patient/Caregiver not ready to engage] : , patient/caregiver not ready to engage [I will adhere to the patient's wishes.] : I will adhere to the patient's wishes. [Time Spent: ___ minutes] : Time Spent: [unfilled] minutes [Current] : Current [0-4] : 0-4

## 2025-07-21 NOTE — HEALTH RISK ASSESSMENT
[Audit-CScore] : 0 [de-identified] : Average [de-identified] : Average [facility]] : 6 [[id number]] : 0 [AdvancecareDate] : 10/24 [No] : No [1 or 2 (0 pts)] : 1 or 2 (0 points) [Never (0 pts)] : Never (0 points) [No falls in past year] : Patient reported no falls in the past year [0] : 2) Feeling down, depressed, or hopeless: Not at all (0) [PHQ-2 Negative - No further assessment needed] : PHQ-2 Negative - No further assessment needed [Patient/Caregiver not ready to engage] : , patient/caregiver not ready to engage [I will adhere to the patient's wishes.] : I will adhere to the patient's wishes. [Time Spent: ___ minutes] : Time Spent: [unfilled] minutes [Current] : Current [0-4] : 0-4

## 2025-07-21 NOTE — REVIEW OF SYSTEMS
[Fever] : no fever [Chills] : no chills [Hot Flashes] : no hot flashes [Night Sweats] : no night sweats [Recent Change In Weight] : ~T no recent weight change [Itching] : no itching [Mole Changes] : no mole changes [Nail Changes] : no nail changes [Skin Rash] : no skin rash [FreeTextEntry2] : overweight [FreeTextEntry7] :   Pilonidal Cyst, Cholecystectomy [de-identified] : Acne Vulgaris, hirsutism  [FreeTextEntry1] : Vit D Deficient  [Fatigue] : fatigue [Hair Changes] : hair changes [Negative] : Heme/Lymph